# Patient Record
Sex: FEMALE | Race: WHITE | Employment: STUDENT | URBAN - METROPOLITAN AREA
[De-identification: names, ages, dates, MRNs, and addresses within clinical notes are randomized per-mention and may not be internally consistent; named-entity substitution may affect disease eponyms.]

---

## 2021-07-17 ENCOUNTER — OFFICE VISIT (OUTPATIENT)
Dept: URGENT CARE | Facility: URGENT CARE | Age: 31
End: 2021-07-17

## 2021-07-17 VITALS
HEIGHT: 61 IN | WEIGHT: 212 LBS | SYSTOLIC BLOOD PRESSURE: 118 MMHG | OXYGEN SATURATION: 97 % | TEMPERATURE: 98.9 F | HEART RATE: 92 BPM | BODY MASS INDEX: 40.02 KG/M2 | DIASTOLIC BLOOD PRESSURE: 84 MMHG

## 2021-07-17 DIAGNOSIS — B35.9 RINGWORM: Primary | ICD-10-CM

## 2021-07-17 PROCEDURE — 99202 OFFICE O/P NEW SF 15 MIN: CPT | Performed by: PHYSICIAN ASSISTANT

## 2021-07-17 RX ORDER — THERMOMETER, ELECTRONIC,ORAL
EACH MISCELLANEOUS 2 TIMES DAILY
Qty: 30 G | Refills: 0 | Status: SHIPPED | OUTPATIENT
Start: 2021-07-17

## 2021-07-17 ASSESSMENT — MIFFLIN-ST. JEOR: SCORE: 1609.01

## 2021-07-17 NOTE — PROGRESS NOTES
Ringworm  - tolnaftate (TINACTIN) 1 % external cream; Apply topically 2 times daily    20 minutes spent on the date of the encounter doing chart review, history and exam, documentation and further activities per the note     See Patient Instructions  Patient Instructions     Patient Education     Ringworm of the Skin     Ringworm is a fungal infection of the skin. Despite the name, a worm doesn't cause it. The cause of ringworm is a fungus that infects the outer layers of the skin.  The medical term for ringworm is tinea. It can affect most parts of your body, although it seems to do better in moist areas of the body and around hair. It can be on almost any part of your body, including:    Arms, hands, legs, chest, feet, and back    Scalp    Beard    Groin    Between the toes  Depending on where it is located, sometimes the name changes. For example:    Tinea capitis (scalp)    Tinea cruris (groin)    Tinea corporis (body)    Tinea pedis (feet)  Causes  Ringworm is very common all over the world, including the [place] It can take less than 1 week up to 2 weeks before you develop the infection after being exposed. So, you may not figure out the exact cause.  It's spread through direct contact with:    An infected person or animal    Infected soil, or objects such as towels, clothing, and soto  Symptoms  At first you might not notice ringworm. Or you may just see a small, red, often raised itchy spot or pimple. Sometimes there may only be one spot. At other times there may be several. Ringworm can look slightly different on different parts of the body, but there are some things are always present:    Irregular, round, oval or ring-shaped, which is why it's called ringworm    Clearer or lighter color at the center, since it spreads from the center of the spot outward    Red or inflamed look    Raised    Itchy    Scaly, dry, or flaky  Home care  Follow these tips to help care for yourself at home:    Leave it alone.  Don't scratch at the rash or pick it. This can increase the chance of infection and scarring.    Take medicine as prescribed. If you were prescribed a cream, apply it exactly as directed. Make sure to put the cream not just on the rash, but also on the skin 1 or 2 inches around it. Medicine by mouth is sometimes needed, particularly for ringworm on the scalp. Take it as directed and until your healthcare provider says to stop. Some ringworm creams are now available without a prescription (over the counter). Talk with your healthcare provider about these, as they may be just as effective but less expensive than prescription medicines in some cases.    Keep it from spreading to others.  Untreated ringworm of the skin is contagious by skin-to-skin contact. An affected child may return to school 2 days after treatment has started.  Prevention  To some degree, prevention depends on what part of your body was affected. In general, the following good hygiene can help.    Clean up after you get dirty or sweaty, or after using a locker room.    When possible, don t share soto and brushes.    Avoid having your skin and feet wet or damp for long periods.    Wear clean, loose-fitting underwear.  Follow-up care  Follow up with your healthcare provider as advised by our staff if the rash does not improve after 10 days of treatment or if the rash spreads to other areas of the body.  When to seek medical care  Call your healthcare provider right away if any of these occur:    Redness around the rash gets worse    Fluid drains from the rash    Fever of 100.4 F (38 C) or higher, or as directed by your healthcare provider  [employer] last reviewed this educational content on 8/1/2019 2000-2021 [employer]. All rights reserved. This information is not intended as a substitute for professional medical care. Always follow your healthcare professional's instructions.               Fracisco Wu PA-C  [facility]  URGENT CARE    Subjective   30 year old who presents to clinic today for the following health issues:    Urgent Care and Derm Problem       HPI     Rash  Onset/Duration: Today  Description  Location: Upper back  Character: flakey, red, circular  Itching: mild and with no pain  Intensity:  mild  Progression of Symptoms:  same  Accompanying signs and symptoms:   Fever: no  Body aches or joint pain: no  Sore throat symptoms: no  Recent cold symptoms: no  History:           Previous episodes of similar rash: None  New exposures:  None  Recent travel: no  Exposure to similar rash: no  Precipitating or alleviating factors: None   Therapies tried and outcome: none      Review of Systems   Review of Systems   See HPI     Objective    Temp: 98.9  F (37.2  C) Temp src: Oral BP: 118/84 Pulse: 92     SpO2: 97 %       Physical Exam   Physical Exam  Constitutional:       General: She is not in acute distress.     Appearance: Normal appearance. She is normal weight. She is not ill-appearing, toxic-appearing or diaphoretic.   HENT:      Head: Normocephalic and atraumatic.   Cardiovascular:      Rate and Rhythm: Normal rate and regular rhythm.      Pulses: Normal pulses.   Pulmonary:      Effort: Pulmonary effort is normal. No respiratory distress.   Skin:     General: Skin is warm and dry.             Comments: There is a raised circular rash in the area shown above. It is non-tender, dry, and flaky.    Neurological:      General: No focal deficit present.      Mental Status: She is alert and oriented to person, place, and time. Mental status is at baseline.      Gait: Gait normal.   Psychiatric:         Mood and Affect: Mood normal.         Behavior: Behavior normal.         Thought Content: Thought content normal.         Judgment: Judgment normal.

## 2021-07-17 NOTE — PATIENT INSTRUCTIONS
Patient Education     Ringworm of the Skin     Ringworm is a fungal infection of the skin. Despite the name, a worm doesn't cause it. The cause of ringworm is a fungus that infects the outer layers of the skin.  The medical term for ringworm is tinea. It can affect most parts of your body, although it seems to do better in moist areas of the body and around hair. It can be on almost any part of your body, including:    Arms, hands, legs, chest, feet, and back    Scalp    Beard    Groin    Between the toes  Depending on where it is located, sometimes the name changes. For example:    Tinea capitis (scalp)    Tinea cruris (groin)    Tinea corporis (body)    Tinea pedis (feet)  Causes  Ringworm is very common all over the world, including the [place] It can take less than 1 week up to 2 weeks before you develop the infection after being exposed. So, you may not figure out the exact cause.  It's spread through direct contact with:    An infected person or animal    Infected soil, or objects such as towels, clothing, and soto  Symptoms  At first you might not notice ringworm. Or you may just see a small, red, often raised itchy spot or pimple. Sometimes there may only be one spot. At other times there may be several. Ringworm can look slightly different on different parts of the body, but there are some things are always present:    Irregular, round, oval or ring-shaped, which is why it's called ringworm    Clearer or lighter color at the center, since it spreads from the center of the spot outward    Red or inflamed look    Raised    Itchy    Scaly, dry, or flaky  Home care  Follow these tips to help care for yourself at home:    Leave it alone. Don't scratch at the rash or pick it. This can increase the chance of infection and scarring.    Take medicine as prescribed. If you were prescribed a cream, apply it exactly as directed. Make sure to put the cream not just on the rash, but also on the skin 1 or 2 inches around  it. Medicine by mouth is sometimes needed, particularly for ringworm on the scalp. Take it as directed and until your healthcare provider says to stop. Some ringworm creams are now available without a prescription (over the counter). Talk with your healthcare provider about these, as they may be just as effective but less expensive than prescription medicines in some cases.    Keep it from spreading to others.  Untreated ringworm of the skin is contagious by skin-to-skin contact. An affected child may return to school 2 days after treatment has started.  Prevention  To some degree, prevention depends on what part of your body was affected. In general, the following good hygiene can help.    Clean up after you get dirty or sweaty, or after using a locker room.    When possible, don t share soto and brushes.    Avoid having your skin and feet wet or damp for long periods.    Wear clean, loose-fitting underwear.  Follow-up care  Follow up with your healthcare provider as advised by our staff if the rash does not improve after 10 days of treatment or if the rash spreads to other areas of the body.  When to seek medical care  Call your healthcare provider right away if any of these occur:    Redness around the rash gets worse    Fluid drains from the rash    Fever of 100.4 F (38 C) or higher, or as directed by your healthcare provider  [employer] last reviewed this educational content on 8/1/2019 2000-2021 [employer]. All rights reserved. This information is not intended as a substitute for professional medical care. Always follow your healthcare professional's instructions.

## 2021-08-03 ENCOUNTER — OFFICE VISIT (OUTPATIENT)
Dept: URGENT CARE | Facility: URGENT CARE | Age: 31
End: 2021-08-03

## 2021-08-03 VITALS
TEMPERATURE: 98.3 F | OXYGEN SATURATION: 98 % | BODY MASS INDEX: 40.06 KG/M2 | SYSTOLIC BLOOD PRESSURE: 124 MMHG | WEIGHT: 212 LBS | DIASTOLIC BLOOD PRESSURE: 60 MMHG | HEART RATE: 50 BPM

## 2021-08-03 DIAGNOSIS — L29.9 ITCHING: ICD-10-CM

## 2021-08-03 DIAGNOSIS — S80.212A KNEE ABRASION, LEFT, INITIAL ENCOUNTER: ICD-10-CM
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
## 2021-08-03 PROBLEM — E66.01 MORBID OBESITY (H): Status: ACTIVE | Noted: 2021-08-03

## 2021-08-03 PROCEDURE — 99213 OFFICE O/P EST LOW 20 MIN: CPT | Performed by: FAMILY MEDICINE

## 2021-08-03 RX ORDER — DIAPER,BRIEF,INFANT-TODD,DISP
EACH MISCELLANEOUS 2 TIMES DAILY
Qty: 30 G | Refills: 0 | Status: SHIPPED | OUTPATIENT
Start: 2021-08-03

## 2021-08-03 NOTE — PROGRESS NOTES
Chief Complaint   Patient presents with     Urgent Care     Blister     c/o blister on leg for 2 days       Medical Decision Making:    ASSESMENT AND PLAN   Rhoda was seen today for urgent care and blister.    Diagnoses and all orders for this visit:    Bug bite, initial encounter  -     hydrocortisone (CORTAID) 0.5 % external cream; Apply topically 2 times daily  -     hydrocortisone (CORTAID) 1 % external ointment; Apply topically 2 times daily    Knee abrasion, left, initial encounter    Itching    last tetanus was in [place] in 2014    For any abrasion advised to apply bacitracin ointment consider Neosporin if symptoms continue to check reaction Neosporin the area was dressed with Telfa and bacitracin and secured with paper tape  with insect bites advised to use hydrocortisone ointment topically to help with the itching also can do Benadryl to help with the itching symptoms.  Tylenol, Fluids and Rest  Antihistamines     I have reviewed the nursing notes.    Differential Diagnosis:  Insect Bite: Insect sting, Spider bite, Mosquito bite, Cellulitis, Anaphyllaxis and Exagerated local reaction to bite      Time  spent on the date of the encounter doing chart review, patient visit and documentation   see orders in Epic  Pt verbalized and agreed with the plan and is aware of the worsening symptoms for which would need to follow up .  Pt was stable during time of discharge from the clinic     SUBJECTIVE     Rhoda Anaya is a 30 year old female presenting with a chief complaint of    Chief Complaint   Patient presents with     Urgent Care     Blister     c/o blister on leg for 2 days     Bite/Sting    Onset of symptoms was 2 day(s) ago.  Course of illness is worsening.    Severity moderate  Location: left lower leg   Context: possible bug bite   Current and Associated symptoms: itching, redness, drainage and hives  Treatment measures tried include: nothing  Relief from treatment: minor  Significant past  medical history: N/A      Left knee abrasion   She also had a fall last week and she fell against the concrete outside and has a knee abrasion   Has been using neosporin         No past medical history on file.  Current Outpatient Medications   Medication Sig Dispense Refill     hydrocortisone (CORTAID) 0.5 % external cream Apply topically 2 times daily 30 g 0     hydrocortisone (CORTAID) 1 % external ointment Apply topically 2 times daily 30 g 0     tolnaftate (TINACTIN) 1 % external cream Apply topically 2 times daily (Patient not taking: Reported on 8/3/2021) 30 g 0     Social History     Tobacco Use     Smoking status: Never Smoker     Smokeless tobacco: Never Used   Substance Use Topics     Alcohol use: Not on file     No family history on file.      ROS:    10 point ROS of systems including Constitutional, Eyes, Respiratory, Cardiovascular, Gastroenterology, Genitourinary, Muscularskeletal, Psychiatric ,neurological were all negative except for pertinent positives noted in my HPI         OBJECTIVE:    /60   Pulse 50   Temp 98.3  F (36.8  C) (Oral)   Wt 96.2 kg (212 lb)   LMP 07/20/2021   SpO2 98%   BMI 40.06 kg/m    GENERAL APPEARANCE: healthy, alert and no distress  EYES: EOMI,  PERRL, conjunctiva clear  HENT: ear canals and TM's normal.  Nose and mouth without ulcers, erythema or lesions  NECK: supple, nontender, no lymphadenopathy  MS: left side knee tenderness to palpation mainly over the area of the knee abrasion which measures 2 cm x 1 cm with no surrounding worsening redness or inflammation.          SKIN: discrete erythematous raised hive like lesions scattered over the left lower leg just below the knee               PSYCH: mentation appears normal  Physical Exam      (Note was completed, in part, with [employer] voice-recognition software. Documentation reviewed, but some grammatical, spelling, and word errors may remain.)  Teresita Stallworth MD on 8/3/2021 at 12:41 PM

## 2021-08-03 NOTE — PATIENT INSTRUCTIONS
Patient Education     Insect Bite  Some insects sting to protect themselves or their nests. These include bees, wasps, ants, and hornets. A sting causes a sharp burning pain. Other insects bite to feed. These include fleas, bedbugs, and mosquitoes. In some cases, the actual bite causes no pain. But after the bite there may be a local reaction. Both bites and stings can cause a local reaction that is an itchy red welt or swelling at the site. Most insect bites and stings don't cause illness. And the itching and swelling most often go away without treatment. But an infection can develop if the bite is scratched and the skin broken.   If a stinger is visible at the bite spot, remove it as quickly as possible. This can reduce the amount of venom that gets into your body. Scrape it out with a dull edge, such as the edge of a credit card. Try not to squeeze it. Don't try to dig it out. You may damage the skin and also increase the chance of infection.   In rare cases, a person may have an allergic reaction to an insect bite or sting. You can have a severe allergic reaction the first time you are bitten or stung. Severe allergic reactions are called anaphylaxis. This is a medical emergency. If you have symptoms listed below after a bite or a sting, have someone call 911.   Symptoms of an allergic reaction often develop quickly and include:     Skin symptoms, such as hives, redness, or swelling away from the area that was stung. For example, the face or lips may swell after being stung on the hand.    Belly cramps, nausea, vomiting, or diarrhea    Hoarse voice, shortness of breath, and trouble breathing    Lightheadedness, dizziness, or passing out     To help reduce swelling and itching, apply a cold pack or ice in a zip-top plastic bag wrapped in a thin towel.   Home care    For local reactions to stings or bites, your healthcare provider may prescribe over-the-counter medicines such as calamine lotion or  antihistamines. These can help ease itching and swelling. Use each medicine according to the directions on the package. If the sting or bite gets infected, you will need an antibiotic. This may be in pill form taken by mouth. Or it may be as an ointment or cream put directly on the skin. Be sure to use them exactly as prescribed.    Bite symptoms often go away on their own in a week or two.    To help prevent infection, don't scratch or pick at the bite.    To help ease itching and swelling, apply ice to the bites. Do this for up to 10 minutes at a time. Don't take hot showers or baths. These often make itching worse. To make an ice pack, put ice cubes in a zip-top plastic bag that seals at the top. Wrap the bag in a clean, thin towel or cloth. Never put ice or an ice pack directly on the skin.    If you think you have insects in your home, talk with a licensed pest-control professional. He or she can inspect your home and tell you how to get rid of bugs safely.    Follow-up care  Follow up with your healthcare provider, or as advised.  Call 911  Call 911 if any of these occur:     Trouble breathing or swallowing    Wheezing    Feeling like your throat is closing up    Fainting, loss of consciousness    Swelling around the face or mouth  When to get medical advice  Call your healthcare provider right away if any of these occur:    Fever of 100.4 F (38 C) or higher, or as directed by your healthcare provider    Signs of infection, such as increased swelling and pain, warmth, red streaks, or drainage from the skin    Signs of allergic reaction, such as hives, a spreading rash, or throat itching  [employer] last reviewed this educational content on 8/1/2019 2000-2021 [employer]. All rights reserved. This information is not intended as a substitute for professional medical care. Always follow your healthcare professional's instructions.           Patient Education     Local Reaction to an Insect  "Sting    You have been stung or bitten by an insect. The insect s venom or body fluid is causing your skin to react in the area where you were stung or bitten. This often causes redness, itching, and swelling. This reaction will often fade over a few hours. But it can last a few days. An insect bite or sting can become infected 1 to 3 days later. So watch for the signs below. Sometimes it is hard to tell the difference between a local reaction to the insect bite or sting and an early infection. Your healthcare provider may give you antibiotics.  Common stinging insects that cause reactions are wasps, bees, yellow jackets, fire ants, and hornets. Common bites are from spiders, mosquitoes, fleas, or ticks. Other types of insects may be more common in different parts of the country or world.  Insect venom causes \"local\" toxic reactions in everyone. Allergic reactions occur only in those who are already sensitive to the venom. The severity of your reaction to the insect sting depends on the dose of venom and the degree to which you are already sensitive to it. Most people think of allergic reactions when someone has a rash or itchy skin. But most stings cause \"localized\" symptoms that are not allergic. These symptoms can include:    Rash, redness, welts, or blisters around the sting or bite    Itching, burning, stinging, or pain    Swelling around the sting area, which may spread and become uncomfortable    Home care  Medicines  The healthcare provider may prescribe medicines to ease swelling, itching, and pain. Follow the provider s instructions when taking these medicines.    Diphenhydramine is an oral antihistamine you can find in stores.. You can use this medicine to reduce itching and swelling. The medicine may make you sleepy. So be careful using it in the daytime or when going to school, working, or driving. Don t use diphenhydramine if you have glaucoma or if you are a man with trouble urinating because of an " enlarged prostate. Other antihistamines may cause less drowsiness. They may be better choices for daytime use. Ask your pharmacist for suggestions.    If you have large areas of localized swelling, you may be prescribed oral corticosteroids, such as prednisone. These can help decrease the swelling and discomfort.    Don t use diphenhydramine cream on your skin. In some people, it can cause more of a localized skin rash due to allergy to the cream.    Calamine lotion or oatmeal baths sometimes help with itching.    You may use acetaminophen or ibuprofen to control pain, unless another pain medicine was prescribed. Talk with your healthcare provider before using these medicines if you have chronic liver or kidney disease. Also talk with your provider if you ve had a stomach ulcer or gastrointestinal (GI) bleeding.    If you had a severe reaction, the provider may prescribe an epinephrine auto-injector. Epinephrine is an emergency medicine that will stop an allergic reaction from getting worse. Before you leave the hospital, be sure that you understand when and how to use this medicine.  General care      If itching is a problem, don t take hot showers or baths. Stay out of direct sunlight. These heat up your skin and will make the itching worse.    Use an ice pack to reduce local areas of redness, swelling, and itching. You can make your own ice pack by putting ice cubes in a bag that seals and wrapping it in a thin towel. Don t put the ice directly on your skin, because it can damage the skin.    Try not to scratch any affected areas to prevent damage to the skin or infection.    If oral antibiotics or oral corticosteroids were prescribed, be sure to take them as directed until finished.  Tips for dealing with insect stings    Be aware that honeybees nest in trees. Wasps and yellow jackets nest in the ground, trees, or roof eaves.    If you are stung by a honeybee, a stinger may remain in your skin. Wasps, yellow  jackets, and hornets don t leave a stinger behind. Move away from the nest area right away. The stinger of a honeybee releases a substance that will attract other bees to you. Once you are away from the nest, remove the stinger as quickly as possible.    After any sting, you may apply ice and take diphenhydramine or another antihistamine. If you develop any of the warning signs below, seek help right away.    If your reaction includes dizziness, fainting, or trouble breathing or swallowing, ask your healthcare provider if you need epinephrine auto-injectors.    Follow-up care  Follow up with your healthcare provider in 2 days, or as advised, if your symptoms don t start to get better.  Call 911  Call 911 if any of these occur:    Trouble breathing or swallowing, or wheezing    New or worsening swelling in the mouth, throat, or tongue    Hoarse voice or trouble speaking    Confusion    Severe drowsiness or trouble awakening    Fainting or loss of consciousness    Rapid heart rate    Low blood pressure    Feeling of doom    Nausea, vomiting, abdominal pain, or diarrhea    Vomiting blood, or large amounts of blood in stool    Seizure  When to seek medical advice  Call your healthcare provider or get medical care right away if any of these occur:    Spreading areas of itching, redness, or swelling    New or worse swelling in the face, eyelids, or lips    Dizziness or weakness  Also call your provider right away if you have signs of infection:    Increasing pain, redness, or swelling    Fever of 100.4 F (38 C) or higher, or as directed by your healthcare provider    Fluid or pus draining from the sting area   [employer] last reviewed this educational content on 10/1/2019    0411-5155 [employer]. All rights reserved. This information is not intended as a substitute for professional medical care. Always follow your healthcare professional's instructions.

## 2021-10-17 ENCOUNTER — HEALTH MAINTENANCE LETTER (OUTPATIENT)
Age: 31
End: 2021-10-17

## 2022-10-03 ENCOUNTER — HEALTH MAINTENANCE LETTER (OUTPATIENT)
Age: 32
End: 2022-10-03

## 2023-02-11 ENCOUNTER — HEALTH MAINTENANCE LETTER (OUTPATIENT)
Age: 33
End: 2023-02-11

## 2024-03-09 ENCOUNTER — HEALTH MAINTENANCE LETTER (OUTPATIENT)
Age: 34
End: 2024-03-09